# Patient Record
Sex: MALE | Employment: FULL TIME | URBAN - METROPOLITAN AREA
[De-identification: names, ages, dates, MRNs, and addresses within clinical notes are randomized per-mention and may not be internally consistent; named-entity substitution may affect disease eponyms.]

---

## 2021-02-24 ENCOUNTER — OFFICE VISIT (OUTPATIENT)
Dept: FAMILY MEDICINE | Facility: CLINIC | Age: 21
End: 2021-02-24
Payer: COMMERCIAL

## 2021-02-24 VITALS
DIASTOLIC BLOOD PRESSURE: 84 MMHG | OXYGEN SATURATION: 97 % | TEMPERATURE: 98.3 F | SYSTOLIC BLOOD PRESSURE: 135 MMHG | WEIGHT: 172 LBS | HEART RATE: 86 BPM

## 2021-02-24 DIAGNOSIS — D17.0 LIPOMA OF HEAD: Primary | ICD-10-CM

## 2021-02-24 PROCEDURE — 99202 OFFICE O/P NEW SF 15 MIN: CPT | Performed by: NURSE PRACTITIONER

## 2021-02-24 SDOH — HEALTH STABILITY: MENTAL HEALTH: HOW OFTEN DO YOU HAVE A DRINK CONTAINING ALCOHOL?: NEVER

## 2021-02-24 SDOH — HEALTH STABILITY: MENTAL HEALTH: HOW MANY STANDARD DRINKS CONTAINING ALCOHOL DO YOU HAVE ON A TYPICAL DAY?: NOT ASKED

## 2021-02-24 SDOH — HEALTH STABILITY: MENTAL HEALTH: HOW OFTEN DO YOU HAVE 6 OR MORE DRINKS ON ONE OCCASION?: NEVER
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
## 2021-02-25 ENCOUNTER — OFFICE VISIT (OUTPATIENT)
Dept: SURGERY | Facility: CLINIC | Age: 21
End: 2021-02-25
Payer: COMMERCIAL

## 2021-02-25 ENCOUNTER — TELEPHONE (OUTPATIENT)
Dept: SURGERY | Facility: CLINIC | Age: 21
End: 2021-02-25

## 2021-02-25 VITALS
BODY MASS INDEX: 29.37 KG/M2 | HEART RATE: 80 BPM | SYSTOLIC BLOOD PRESSURE: 118 MMHG | HEIGHT: 64 IN | WEIGHT: 172 LBS | DIASTOLIC BLOOD PRESSURE: 76 MMHG | OXYGEN SATURATION: 98 %

## 2021-02-25 DIAGNOSIS — L72.3 SEBACEOUS CYST: Primary | ICD-10-CM

## 2021-02-25 DIAGNOSIS — Z11.59 ENCOUNTER FOR SCREENING FOR OTHER VIRAL DISEASES: ICD-10-CM

## 2021-02-25 PROCEDURE — 99203 OFFICE O/P NEW LOW 30 MIN: CPT | Performed by: SURGERY

## 2021-02-25 ASSESSMENT — MIFFLIN-ST. JEOR: SCORE: 1701.19

## 2021-02-25 NOTE — TELEPHONE ENCOUNTER
Type of surgery: excision scalp mass  Location of surgery: [facility] OR  Date and time of surgery: 3/3/21 10:30am  Surgeon: Dr Singh  Pre-Op Appt Date: not required  Post-Op Appt Date: pt to schedule   Packet sent out: Yes  Pre-cert/Authorization completed:  Not Applicable  Date: 2/25/21

## 2021-02-25 NOTE — PROGRESS NOTES
"[facility]  Surgery Consultation    HPI:Patient is a 21 year old male who is here for consultation requested by No Ref-Primary, Physician None for evaluation of scalp cyst. Has been present since May. Increasing in size. More tender now. Pain after haircuts or wearing has. No drainage. No other lesions. Patient denies fevers, chills, nausea, vomiting, SOB, chest pain, abdominal pain.    PMH: Past medical history reviewed. No pertinent history applicable.  PSH: Past surgical history reviewed. No pertinent surgical history applicable.Social History:    reports that he has never smoked. He has never used smokeless tobacco. He reports that he does not drink alcohol or use drugs.  Family History: Family history personally reviewed and revealed no pertinent history.  Medications/Allergies: Home medications and allergies reviewed.    ROS:  The 10 point Review of Systems is negative other than noted in the HPI.    Physical Exam:  /76 (BP Location: Left arm, Patient Position: Sitting, Cuff Size: Adult Regular)   Pulse 80   Ht 1.626 m (5' 4\")   Wt 78 kg (172 lb)   SpO2 98%   BMI 29.52 kg/m    General: Generally appears well.  Eyes- Sclera Clear- No icterus  Respiratory- Chest rise equal Bilateral  Scalp- Left posterior- 1-2 soft mobile mass. Skin intact    Impression and Plan:  Patient is a 21 year old male with scalp mass    PLAN:   Patient has a symptomatic scalp mass. Would recommend going forward with excision under local. The risks associated with the procedure including, but not limited to, recurrence, pain, hematoma, infection, and anesthetic reaction were discussed with the patient. The patient indicated understanding of the discussion, asked appropriate questions, and provided consent.       Thank you very much for this consult.    Cruz Singh M.D.  [facility]  [phone]    Please route or send letter to:  Primary Care Provider (PCP) and Referring Provider  "

## 2021-03-01 DIAGNOSIS — Z11.59 ENCOUNTER FOR SCREENING FOR OTHER VIRAL DISEASES: ICD-10-CM

## 2021-03-01 LAB
SARS-COV-2 RNA RESP QL NAA+PROBE: NORMAL
SPECIMEN SOURCE: NORMAL

## 2021-03-01 PROCEDURE — U0005 INFEC AGEN DETEC AMPLI PROBE: HCPCS | Performed by: SURGERY

## 2021-03-01 PROCEDURE — U0003 INFECTIOUS AGENT DETECTION BY NUCLEIC ACID (DNA OR RNA); SEVERE ACUTE RESPIRATORY SYNDROME CORONAVIRUS 2 (SARS-COV-2) (CORONAVIRUS DISEASE [COVID-19]), AMPLIFIED PROBE TECHNIQUE, MAKING USE OF HIGH THROUGHPUT TECHNOLOGIES AS DESCRIBED BY CMS-2020-01-R: HCPCS | Performed by: SURGERY

## 2021-03-02 LAB
LABORATORY COMMENT REPORT: NORMAL
SARS-COV-2 RNA RESP QL NAA+PROBE: NEGATIVE
SPECIMEN SOURCE: NORMAL

## 2021-03-03 ENCOUNTER — HOSPITAL ENCOUNTER (OUTPATIENT)
Facility: CLINIC | Age: 21
Discharge: HOME | End: 2021-03-03
Attending: SURGERY | Admitting: SURGERY
Payer: COMMERCIAL

## 2021-03-03 ENCOUNTER — APPOINTMENT (OUTPATIENT)
Dept: SURGERY | Facility: PHYSICIAN GROUP | Age: 21
End: 2021-03-03
Payer: COMMERCIAL

## 2021-03-03 ENCOUNTER — SURGERY (OUTPATIENT)
Age: 21
End: 2021-03-03

## 2021-03-03 ENCOUNTER — TELEPHONE (OUTPATIENT)
Dept: SURGERY | Facility: CLINIC | Age: 21
End: 2021-03-03

## 2021-03-03 VITALS
WEIGHT: 172 LBS | HEIGHT: 64 IN | TEMPERATURE: 98.4 F | BODY MASS INDEX: 29.37 KG/M2 | RESPIRATION RATE: 16 BRPM | HEART RATE: 90 BPM | OXYGEN SATURATION: 97 % | DIASTOLIC BLOOD PRESSURE: 101 MMHG | SYSTOLIC BLOOD PRESSURE: 126 MMHG

## 2021-03-03 DIAGNOSIS — L72.3 SEBACEOUS CYST: ICD-10-CM

## 2021-03-03 PROCEDURE — 250N000009 HC RX 250: Performed by: SURGERY

## 2021-03-03 PROCEDURE — 11422 EXC H-F-NK-SP B9+MARG 1.1-2: CPT | Performed by: SURGERY

## 2021-03-03 PROCEDURE — 88304 TISSUE EXAM BY PATHOLOGIST: CPT | Mod: TC | Performed by: SURGERY

## 2021-03-03 PROCEDURE — 88304 TISSUE EXAM BY PATHOLOGIST: CPT | Mod: 26 | Performed by: PATHOLOGY

## 2021-03-03 RX ORDER — LIDOCAINE HYDROCHLORIDE 10 MG/ML
10 INJECTION, SOLUTION EPIDURAL; INFILTRATION; INTRACAUDAL; PERINEURAL ONCE
Status: DISCONTINUED | OUTPATIENT
Start: 2021-03-03 | End: 2021-03-03 | Stop reason: HOSPADM

## 2021-03-03 RX ADMIN — LIDOCAINE HYDROCHLORIDE 10 ML: 10; .005 INJECTION, SOLUTION EPIDURAL; INFILTRATION; INTRACAUDAL; PERINEURAL at 10:28

## 2021-03-03 ASSESSMENT — MIFFLIN-ST. JEOR: SCORE: 1701.19

## 2021-03-03 NOTE — TELEPHONE ENCOUNTER
Name of caller: Patient    Reason for Call:  Symptoms  Patient had surgery today and the wound seems to have a continuous bit of blood oozing out. Wondering if this is normal?    Surgeon:  Dr. Singh    Recent Surgery:  Yes.    If yes, when & what type:  3/3/2021      EXCISION SCALP MASS    Best phone number to reach pt at is: [phone]    Ok to leave a message with medical info? Yes.

## 2021-03-03 NOTE — OP NOTE
Pre-Operative Diagnosis-Posterior scalp cyst    Post-Operative Diagnosis- Same    Procedure- Excision of 2 cm posterior scalp cyst/lipoma     Surgeon- Samantha    Assistant- None    Anesthesia- 1% lidocaine with epi    Specimen-cyst/lipoma    Procedure  Consent was obtained. A surgical time out was performed. The patient was prepped and draped in the usual sterile fashion with Chloraprep. Using a sterile technique, 1 percent lidocaine with epinephrine was used to infiltrate the area. After adequate anesthesia was confirmed, a number 15 scalpel was used to make an incision overlying the lesion. Using sharp dissection a 2 cm cyst and lipoma was found and circumferentially freed.  It was removed in pieces. There was some bleeding which was completely controlled with a 3-0 nylon suture.  The wound bed was completely dry after this was performed.  Additional 3.0 nylons were placed to close the wound completely.  Site was dry with no visible bleeding. Patient tolerated the procedure well without complications. All sponge, instrument, and needle counts were correct at the conclusion of the case.      Cruz Singh M.D.  [facility]  [phone]    Please route or send letter to:  Primary Care Provider (PCP) and Referring Provider

## 2021-03-03 NOTE — TELEPHONE ENCOUNTER
Procedure: excision of 2 cm posterior scalp cyst/lipoma    Date: 03/03/2021    Surgeon: Samantha    Patient reports continuous oozing/bleeding from surgical area.  Area has ointment over it, but not dressing.    Recommend patient apply direct pressure to the area with gauze pads.  Can also try ice packs to help slow down/stop the bleeding.    If bleeding persists, he should call back to discuss next steps.    He is in agreement and will call PRN.    Sharri Abreu RN-BSN

## 2021-03-04 LAB — COPATH REPORT: NORMAL

## 2021-03-06 ENCOUNTER — HEALTH MAINTENANCE LETTER (OUTPATIENT)
Age: 21
End: 2021-03-06

## 2021-03-11 ENCOUNTER — OFFICE VISIT (OUTPATIENT)
Dept: SURGERY | Facility: CLINIC | Age: 21
End: 2021-03-11

## 2021-03-11 DIAGNOSIS — Z09 SURGERY FOLLOW-UP EXAMINATION: Primary | ICD-10-CM

## 2021-03-11 PROCEDURE — 99024 POSTOP FOLLOW-UP VISIT: CPT | Performed by: SURGERY

## 2021-03-11 NOTE — PROGRESS NOTES
Surgery    No issues with healing.    Scalp-sutures removed.  Incision intact.    A/P  Sutures removed without issues.  Final path is benign.  Follow-up as needed    Cruz Singh M.D.  [facility]  [phone]

## 2021-10-04 ENCOUNTER — HEALTH MAINTENANCE LETTER (OUTPATIENT)
Age: 21
End: 2021-10-04

## 2022-03-20 ENCOUNTER — HEALTH MAINTENANCE LETTER (OUTPATIENT)
Age: 22
End: 2022-03-20

## 2022-09-11 ENCOUNTER — HEALTH MAINTENANCE LETTER (OUTPATIENT)
Age: 22
End: 2022-09-11

## 2023-04-30 ENCOUNTER — HEALTH MAINTENANCE LETTER (OUTPATIENT)
Age: 23
End: 2023-04-30

## 2025-06-15 ENCOUNTER — HOSPITAL ENCOUNTER (EMERGENCY)
Facility: CLINIC | Age: 25
Discharge: HOME | End: 2025-06-15
Attending: EMERGENCY MEDICINE | Admitting: EMERGENCY MEDICINE
Payer: COMMERCIAL

## 2025-06-15 VITALS
SYSTOLIC BLOOD PRESSURE: 118 MMHG | TEMPERATURE: 97.9 F | DIASTOLIC BLOOD PRESSURE: 60 MMHG | OXYGEN SATURATION: 97 % | RESPIRATION RATE: 18 BRPM | HEART RATE: 111 BPM
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
## 2025-06-15 DIAGNOSIS — S00.83XA FACIAL CONTUSION, INITIAL ENCOUNTER: ICD-10-CM

## 2025-06-15 DIAGNOSIS — T07.XXXA MULTIPLE LACERATIONS: ICD-10-CM

## 2025-06-15 PROCEDURE — 12001 RPR S/N/AX/GEN/TRNK 2.5CM/<: CPT | Mod: XU

## 2025-06-15 PROCEDURE — 99283 EMERGENCY DEPT VISIT LOW MDM: CPT | Mod: 25

## 2025-06-15 PROCEDURE — 250N000009 HC RX 250: Performed by: EMERGENCY MEDICINE

## 2025-06-15 PROCEDURE — 250N000011 HC RX IP 250 OP 636: Performed by: EMERGENCY MEDICINE

## 2025-06-15 PROCEDURE — 90471 IMMUNIZATION ADMIN: CPT | Performed by: EMERGENCY MEDICINE

## 2025-06-15 PROCEDURE — 12011 RPR F/E/E/N/L/M 2.5 CM/<: CPT

## 2025-06-15 PROCEDURE — 90715 TDAP VACCINE 7 YRS/> IM: CPT | Performed by: EMERGENCY MEDICINE

## 2025-06-15 RX ADMIN — Medication: at 05:27

## 2025-06-15 RX ADMIN — CLOSTRIDIUM TETANI TOXOID ANTIGEN (FORMALDEHYDE INACTIVATED), CORYNEBACTERIUM DIPHTHERIAE TOXOID ANTIGEN (FORMALDEHYDE INACTIVATED), BORDETELLA PERTUSSIS TOXOID ANTIGEN (GLUTARALDEHYDE INACTIVATED), BORDETELLA PERTUSSIS FILAMENTOUS HEMAGGLUTININ ANTIGEN (FORMALDEHYDE INACTIVATED), BORDETELLA PERTUSSIS PERTACTIN ANTIGEN, AND BORDETELLA PERTUSSIS FIMBRIAE 2/3 ANTIGEN 0.5 ML: 5; 2; 2.5; 5; 3; 5 INJECTION, SUSPENSION INTRAMUSCULAR at 04:10

## 2025-06-15 ASSESSMENT — ACTIVITIES OF DAILY LIVING (ADL)
ADLS_ACUITY_SCORE: 41
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
## 2025-06-23 ENCOUNTER — ALLIED HEALTH/NURSE VISIT (OUTPATIENT)
Dept: PEDIATRICS | Facility: CLINIC | Age: 25
End: 2025-06-23
Payer: COMMERCIAL

## 2025-06-23 DIAGNOSIS — Z48.02 VISIT FOR SUTURE REMOVAL: Primary | ICD-10-CM

## 2025-06-23 PROCEDURE — 99207 PR NO CHARGE NURSE ONLY: CPT

## 2025-06-23 NOTE — PROGRESS NOTES
Lyle Garcia presents to the clinic today for removal of sutures on right ear.  The patient has had the sutures in place for 8 days.  ER recommended removal in 7 days. There has been no history of infection or drainage.    5 sutures are seen located on the right ear.  The wound is healing well with no signs of infection.  Tetanus status is up to date.   All sutures were easily removed today.  Routine wound care discussed.  The patient will follow up as needed.     Patient scheduled to come back for 3 sutures to be removed on right arm as recommended by ER staff. Scheduled for Wednesday 6/25 at 9:30. Patient verbalized understanding and agreement in plan.    Alexus Parker RN   [facility]

## 2025-06-25 ENCOUNTER — ALLIED HEALTH/NURSE VISIT (OUTPATIENT)
Dept: PEDIATRICS | Facility: CLINIC | Age: 25
End: 2025-06-25
Payer: COMMERCIAL

## 2025-06-25 DIAGNOSIS — Z48.02 VISIT FOR SUTURE REMOVAL: Primary | ICD-10-CM

## 2025-06-25 PROCEDURE — 99207 PR NO CHARGE NURSE ONLY: CPT

## 2025-06-25 NOTE — PROGRESS NOTES
Lyle Garcia presents to the clinic today for removal of sutures.  The patient has had the sutures in place for 10 days.  There has been no history of infection or drainage.  1 suture right upper arm & 2 sutures right lower arm near elbow. The wounds are healing well with no signs of infection.  Tetanus status is up to date.   All sutures were easily removed today.  Routine wound care discussed.  The patient will follow up as needed.     Patient tolerated the procedure well.    Answered all questions. Patient will follow up as needed.    Komal Jackson RN

## 2025-06-28 ENCOUNTER — HEALTH MAINTENANCE LETTER (OUTPATIENT)
Age: 25
End: 2025-06-28